# Patient Record
Sex: FEMALE | Race: WHITE | NOT HISPANIC OR LATINO | Employment: UNEMPLOYED | URBAN - METROPOLITAN AREA
[De-identification: names, ages, dates, MRNs, and addresses within clinical notes are randomized per-mention and may not be internally consistent; named-entity substitution may affect disease eponyms.]

---
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
## 2023-01-09 ENCOUNTER — TELEPHONE (OUTPATIENT)
Dept: OBGYN CLINIC | Facility: CLINIC | Age: 18
End: 2023-01-09

## 2023-01-09 NOTE — TELEPHONE ENCOUNTER
Patient is calling regarding previous notes - she is vomiting sporadically - started before menses and during menses - patient stated that partners haven't fully ejaculated in her so she doesn't think she could be pregnant - I advised her to take a home pregnancy test just in case because she then started asking if vomiting was a sign of pregnancy and how soon symptoms can occur   Mccartney Agusto She will be in class so if nurse can call after an hour

## 2023-01-09 NOTE — TELEPHONE ENCOUNTER
Pt has ParaGard, has had for quite sometime, she is currently bleeding, always has a heavy period, advised may be vomiting from stomach bug, or axious over th bleeding, if feeling lightheaded advised ER , can take hpt next week if unsure, but hasnt had sex in 2 mo, apt end of the month,

## 2023-01-09 NOTE — TELEPHONE ENCOUNTER
Pt called and stated she has not been feeling well since IUD was inserted, Has been throwing up and not feeling well, Pls call pt to advise and get more information, Thank you